# Patient Record
Sex: FEMALE | Race: WHITE | NOT HISPANIC OR LATINO | Employment: PART TIME | URBAN - METROPOLITAN AREA
[De-identification: names, ages, dates, MRNs, and addresses within clinical notes are randomized per-mention and may not be internally consistent; named-entity substitution may affect disease eponyms.]

---

## 2019-08-14 ENCOUNTER — TELEPHONE (OUTPATIENT)
Dept: SCHEDULING | Facility: IMAGING CENTER | Age: 39
End: 2019-08-14

## 2019-11-12 ENCOUNTER — OFFICE VISIT (OUTPATIENT)
Dept: MEDICAL GROUP | Facility: MEDICAL CENTER | Age: 39
End: 2019-11-12
Attending: FAMILY MEDICINE
Payer: MEDICAID

## 2019-11-12 VITALS
OXYGEN SATURATION: 96 % | WEIGHT: 190 LBS | HEART RATE: 100 BPM | TEMPERATURE: 98.7 F | SYSTOLIC BLOOD PRESSURE: 110 MMHG | HEIGHT: 63 IN | RESPIRATION RATE: 16 BRPM | BODY MASS INDEX: 33.66 KG/M2 | DIASTOLIC BLOOD PRESSURE: 64 MMHG

## 2019-11-12 DIAGNOSIS — K21.9 GASTROESOPHAGEAL REFLUX DISEASE WITHOUT ESOPHAGITIS: ICD-10-CM

## 2019-11-12 DIAGNOSIS — E66.9 OBESITY (BMI 30-39.9): ICD-10-CM

## 2019-11-12 DIAGNOSIS — Z23 NEEDS FLU SHOT: ICD-10-CM

## 2019-11-12 DIAGNOSIS — E73.9 LACTOSE INTOLERANCE: ICD-10-CM

## 2019-11-12 DIAGNOSIS — Z23 NEED FOR TDAP VACCINATION: ICD-10-CM

## 2019-11-12 DIAGNOSIS — Z00.00 HEALTHCARE MAINTENANCE: ICD-10-CM

## 2019-11-12 DIAGNOSIS — N28.9 KIDNEY DISEASE: ICD-10-CM

## 2019-11-12 DIAGNOSIS — Z12.39 SCREENING FOR MALIGNANT NEOPLASM OF BREAST: ICD-10-CM

## 2019-11-12 PROCEDURE — 90715 TDAP VACCINE 7 YRS/> IM: CPT | Performed by: FAMILY MEDICINE

## 2019-11-12 PROCEDURE — 99202 OFFICE O/P NEW SF 15 MIN: CPT | Performed by: FAMILY MEDICINE

## 2019-11-12 PROCEDURE — 99204 OFFICE O/P NEW MOD 45 MIN: CPT | Performed by: FAMILY MEDICINE

## 2019-11-12 PROCEDURE — 90686 IIV4 VACC NO PRSV 0.5 ML IM: CPT | Performed by: FAMILY MEDICINE

## 2019-11-12 RX ORDER — PANTOPRAZOLE SODIUM 40 MG/1
40 TABLET, DELAYED RELEASE ORAL
Refills: 0 | COMMUNITY
Start: 2019-09-10 | End: 2021-08-03

## 2019-11-12 RX ORDER — OMEPRAZOLE 20 MG/1
20 CAPSULE, DELAYED RELEASE ORAL DAILY
Qty: 30 CAP | Refills: 11 | Status: SHIPPED | OUTPATIENT
Start: 2019-11-12 | End: 2021-08-03

## 2019-11-12 SDOH — HEALTH STABILITY: MENTAL HEALTH: HOW OFTEN DO YOU HAVE A DRINK CONTAINING ALCOHOL?: MONTHLY OR LESS

## 2019-11-12 SDOH — HEALTH STABILITY: MENTAL HEALTH: HOW OFTEN DO YOU HAVE 6 OR MORE DRINKS ON ONE OCCASION?: LESS THAN MONTHLY

## 2019-11-12 NOTE — ASSESSMENT & PLAN NOTE
Patient is concerned about the increased rate of hair loss when questioned.  She also complains of feeling cold with some regularity.  She is not aware of any previous thyroid testing.  Through weight watchers she has lost 14 pounds in the past 9 months.  She is currently not attending that program.  Is anticipating pursuing further weight loss.

## 2019-11-12 NOTE — PROGRESS NOTES
Chief Complaint   Patient presents with   • Establish Care     possible restless leg syndrome   • Cough       HISTORY OF PRESENT ILLNESS: Patient is a 39 y.o. female established patient who presents today to establish care and discuss the following problems        Obesity (BMI 30-39.9)  Patient is concerned about the increased rate of hair loss when questioned.  She also complains of feeling cold with some regularity.  She is not aware of any previous thyroid testing.  Through weight watchers she has lost 14 pounds in the past 9 months.  She is currently not attending that program.  Is anticipating pursuing further weight loss.    Gastroesophageal reflux disease without esophagitis  Patient reports current substernal burning along with a recurrent cough may be associated with slight regurgitation.  She is not actually having formal emesis.  She reports that when she was taking omeprazole the cough symptom was not there.  She is been out of omeprazole for several months.  She denies black or bloody stools.    Social history-,   Patient Active Problem List    Diagnosis Date Noted   • Obesity (BMI 30-39.9) 11/12/2019   • Gastroesophageal reflux disease without esophagitis 11/12/2019   • Lactose intolerance 11/12/2019   • Kidney disease        Allergies:Patient has no known allergies.    Current Outpatient Medications   Medication Sig Dispense Refill   • omeprazole (PRILOSEC) 20 MG delayed-release capsule Take 1 Cap by mouth every day. 30 Cap 11     No current facility-administered medications for this visit.        Social History     Tobacco Use   • Smoking status: Never Smoker   • Smokeless tobacco: Never Used   Substance Use Topics   • Alcohol use: Yes     Frequency: Monthly or less     Binge frequency: Less than monthly   • Drug use: Never       Family History   Problem Relation Age of Onset   • Diabetes Mother    • Stroke Father    • Cancer Paternal Aunt         Breast   • Cancer Maternal  "Grandmother         Breast   • Heart Disease Neg Hx        ROS:  Review of Systems   Constitutional: Negative for fever, chills, weight loss and malaise/fatigue.   Eyes: Negative for blurred vision.   Respiratory: Negative for cough, sputum production, shortness of breath and wheezing.    Cardiovascular: Negative for chest pain, palpitations, orthopnea and leg swelling.   Gastrointestinal: Past for heartburn, nausea, without vomiting and abdominal pain.   Musculoskeletal: Negative for myalgias, back pain and joint pain.   Endo/Heme/Allergies: Does not bruise/bleed easily.               Exam:  /64 (BP Location: Left arm, Patient Position: Sitting, BP Cuff Size: Adult)   Pulse 100   Temp 37.1 °C (98.7 °F) (Temporal)   Resp 16   Ht 1.588 m (5' 2.5\")   Wt 86.2 kg (190 lb)   SpO2 96%   General:  Well nourished, well developed female in NAD  Head is grossly normal.  Neck: Supple without JVD or bruit. Thyroid is not enlarged. Trachea is midline.  Pulmonary: Clear to ausculation .  Normal effort. No rales, ronchi, or wheezing.  Cardiovascular: Regular rate and rhythm without murmur.  Abdomen-Abdomen is soft, normal bowel sounds, no masses, guarding, ororganomegaly, or tenderness.  Lower extremities- neg for pretibial edema, redness, tenderness.      Please note that this dictation was created using voice recognition software. I have made every reasonable attempt to correct obvious errors, but I expect that there are errors of grammar and possibly content that I did not discover before finalizing the note.    Assessment/Plan:  1. Kidney disease     2. Needs flu shot  Influenza Vaccine Quad Injection (PF)   3. Screening for malignant neoplasm of breast  MA-SCREENING MAMMO BILAT W/TOMOSYNTHESIS W/CAD   4. Obesity (BMI 30-39.9)  Patient identified as having weight management issue.  Appropriate orders and counseling given.    TSH   5. Gastroesophageal reflux disease without esophagitis  omeprazole (PRILOSEC) 20 MG " delayed-release capsule   6. Need for Tdap vaccination  Tdap =>8yo IM   7. Lactose intolerance     8. Healthcare maintenance  Comp Metabolic Panel    Lipid Profile     Plan: 1.  Collect fasting lipids, CMP, TSH  2.  Rx omeprazole capsules 20 mg  3.  Flu vaccine  4.  Patient is encouraged to actively limit calories to promote further weight loss  5.  Screening mammogram

## 2019-11-12 NOTE — ASSESSMENT & PLAN NOTE
Patient reports current substernal burning along with a recurrent cough may be associated with slight regurgitation.  She is not actually having formal emesis.  She reports that when she was taking omeprazole the cough symptom was not there.  She is been out of omeprazole for several months.  She denies black or bloody stools.

## 2019-12-07 ENCOUNTER — HOSPITAL ENCOUNTER (OUTPATIENT)
Dept: RADIOLOGY | Facility: MEDICAL CENTER | Age: 39
End: 2019-12-07
Attending: FAMILY MEDICINE
Payer: MEDICAID

## 2019-12-07 DIAGNOSIS — Z12.39 SCREENING FOR MALIGNANT NEOPLASM OF BREAST: ICD-10-CM

## 2019-12-07 PROCEDURE — 77063 BREAST TOMOSYNTHESIS BI: CPT

## 2019-12-16 ENCOUNTER — TELEPHONE (OUTPATIENT)
Dept: MEDICAL GROUP | Facility: MEDICAL CENTER | Age: 39
End: 2019-12-16

## 2019-12-17 NOTE — TELEPHONE ENCOUNTER
Phone Number Called: [phone] (home)       Call outcome: left message for patient to call back regarding message below    Message: Mammogram results are normal. Please repeat exam in 1 year.  Dr. Bright

## 2019-12-17 NOTE — TELEPHONE ENCOUNTER
----- Message from Shane Tyler M.D. sent at 12/12/2019  8:21 AM PST -----  Mammogram results are normal. Please repeat exam in 1 year.  Dr. Bright

## 2019-12-19 RX ORDER — OMEPRAZOLE 20 MG/1
20 CAPSULE, DELAYED RELEASE ORAL 2 TIMES DAILY
Qty: 60 CAP | Refills: 4 | Status: SHIPPED
Start: 2019-12-19 | End: 2021-08-03

## 2019-12-19 NOTE — TELEPHONE ENCOUNTER
Patient reports that recent restart of omeprazole 20 mg daily is not controlling her substernal burning/heartburn.  In the past she reports that she actually had to take this medicine routinely twice daily.  New Rx for twice daily dosage written.  Dr. Bright

## 2020-05-18 ENCOUNTER — OFFICE VISIT (OUTPATIENT)
Dept: MEDICAL GROUP | Facility: MEDICAL CENTER | Age: 40
End: 2020-05-18
Attending: FAMILY MEDICINE
Payer: MEDICAID

## 2020-05-18 VITALS
RESPIRATION RATE: 20 BRPM | WEIGHT: 193 LBS | SYSTOLIC BLOOD PRESSURE: 128 MMHG | HEIGHT: 63 IN | BODY MASS INDEX: 34.2 KG/M2 | TEMPERATURE: 97.6 F | OXYGEN SATURATION: 94 % | HEART RATE: 103 BPM | DIASTOLIC BLOOD PRESSURE: 86 MMHG

## 2020-05-18 DIAGNOSIS — R05.8 ALLERGIC COUGH: ICD-10-CM

## 2020-05-18 DIAGNOSIS — L23.9 ALLERGIC DERMATITIS: ICD-10-CM

## 2020-05-18 PROCEDURE — 99213 OFFICE O/P EST LOW 20 MIN: CPT | Performed by: FAMILY MEDICINE

## 2020-05-18 RX ORDER — CIMETIDINE 400 MG/1
400 TABLET, FILM COATED ORAL 2 TIMES DAILY
Qty: 60 TAB | Refills: 6 | Status: SHIPPED | OUTPATIENT
Start: 2020-05-18 | End: 2021-08-03

## 2020-05-18 RX ORDER — FLUTICASONE FUROATE 50 UG/1
1 POWDER RESPIRATORY (INHALATION) DAILY
Qty: 1 EACH | Refills: 11 | Status: SHIPPED | OUTPATIENT
Start: 2020-05-18 | End: 2021-08-03

## 2020-05-19 NOTE — PROGRESS NOTES
"Subjective:      Abelardo Crowell is a 39 y.o. female who presents with Rash            HPI 1.  Allergic dermatitis-patient reports a many year history of episodic itching sometimes with pinkish discoloration occasionally with small hives/papules that will erupt over her neck, upper extremities and just recently 4 days ago an episode at her posterior neck hairline bilaterally.  They are intensely pruritic cause her to scratch but typically resolve over 2 to 3 days.  There are no associated vesicles or fever.  She cannot find any obvious trigger.  Medications have not recently changed (long-term Rx of omeprazole x5 years).  2.  Allergic cough-patient reports that if she becomes very active physically she will start to develop a cough and rarely wheeze.  She denies current routine cough when not exerting herself, no recent fever, cough is nonproductive    ROS negative for chest pain, chest pressure, palpitations       Objective:     /86 (BP Location: Left arm, Patient Position: Sitting, BP Cuff Size: Adult)   Pulse (!) 103   Temp 36.4 °C (97.6 °F) (Temporal)   Resp 20   Ht 1.588 m (5' 2.5\")   Wt 87.5 kg (193 lb)   SpO2 94%   BMI 34.74 kg/m²      Physical Exam  Gen.- alert, cooperative, in no acute distress  Neck- midline trachea, thyroid not enlarged or tender,supple, no cervical adenopathy  Chest-clear to auscultation and percussion with normal breath sounds. No retractions. Chest wall nontender  Cardiac- regular rhythm and rate. No murmur, thrill, or heave  Skin-clear at this time other than 3 faint pink papules 3 to 4 mm apiece at her posterior neck hairline          Assessment/Plan:       1. Allergic dermatitis    - cimetidine (TAGAMET) 400 MG Tab; Take 1 Tab by mouth 2 times a day.  Dispense: 60 Tab; Refill: 6    2. Allergic cough    - Fluticasone Furoate (ARNUITY ELLIPTA) 50 MCG/ACT AEROSOL POWDER, BREATH ACTIVATED; Inhale 1 Puff by mouth every day.  Dispense: 1 Each; Refill: 11  Plan: 1.  Trial of " Arnuity 50 mcg 1 inhalation daily  2.  Trial of Tagamet 400 to 800 mg nightly  3.  Revisit in 1 month-consider Kenalog injection

## 2020-06-18 ENCOUNTER — OFFICE VISIT (OUTPATIENT)
Dept: MEDICAL GROUP | Facility: MEDICAL CENTER | Age: 40
End: 2020-06-18
Attending: FAMILY MEDICINE
Payer: MEDICAID

## 2020-06-18 VITALS
BODY MASS INDEX: 34.2 KG/M2 | DIASTOLIC BLOOD PRESSURE: 78 MMHG | SYSTOLIC BLOOD PRESSURE: 122 MMHG | HEIGHT: 63 IN | RESPIRATION RATE: 16 BRPM | TEMPERATURE: 97.9 F | OXYGEN SATURATION: 95 % | WEIGHT: 193 LBS | HEART RATE: 80 BPM

## 2020-06-18 DIAGNOSIS — R05.8 ALLERGIC COUGH: ICD-10-CM

## 2020-06-18 PROCEDURE — 99213 OFFICE O/P EST LOW 20 MIN: CPT | Performed by: FAMILY MEDICINE

## 2020-06-18 RX ORDER — ALBUTEROL SULFATE 90 UG/1
2 AEROSOL, METERED RESPIRATORY (INHALATION) EVERY 6 HOURS PRN
Qty: 8.5 G | Refills: 6 | Status: SHIPPED | OUTPATIENT
Start: 2020-06-18 | End: 2021-08-03 | Stop reason: SDUPTHER

## 2020-06-18 NOTE — PROGRESS NOTES
"Subjective:      Abelardo Crowell is a 39 y.o. female who presents with No chief complaint on file.            HPI 1.  Allergic cough-patient reports she noticed that most of mild improvement with starting the 50 mcg strength of Arnuity 1 month ago.  However she ran out of her Arnuity inhaler 2 days ago and noticed marked increase in the intensity and frequency of cough.  Patient reports that she will very rarely bring up some clear phlegm which does seem to help at that moment.  ROS positive for occasional wheezing.  No current fever or sore throat.       Objective:     /78 (BP Location: Left arm, Patient Position: Sitting, BP Cuff Size: Large adult)   Pulse 80   Temp 36.6 °C (97.9 °F) (Temporal)   Resp 16   Ht 1.588 m (5' 2.52\")   Wt 87.5 kg (193 lb)   SpO2 95%   BMI 34.72 kg/m²      Physical Exam  Gen.- alert, cooperative, in no acute distress  Neck- midline trachea, thyroid not enlarged or tender,supple, no cervical adenopathy  Chest-clear to auscultation and percussion with normal breath sounds. No retractions. Chest wall nontender  Cardiac- regular rhythm and rate. No murmur, thrill, or heave  Oropharynx-clear pink moist mucosa without swelling redness or exudate.  Tongue is midline          Assessment/Plan:       1. Allergic cough    Plan: 1.  Trial increasing Arnuity up to 200 mcg strength 1 puff daily  2.  Revisit with me in 1 month  3.  Patient is reminded to use albuterol rescue inhaler as needed  "

## 2020-07-16 ENCOUNTER — OFFICE VISIT (OUTPATIENT)
Dept: MEDICAL GROUP | Facility: MEDICAL CENTER | Age: 40
End: 2020-07-16
Attending: FAMILY MEDICINE
Payer: MEDICAID

## 2020-07-16 VITALS
OXYGEN SATURATION: 98 % | WEIGHT: 193 LBS | TEMPERATURE: 97.4 F | RESPIRATION RATE: 16 BRPM | SYSTOLIC BLOOD PRESSURE: 128 MMHG | HEART RATE: 88 BPM | DIASTOLIC BLOOD PRESSURE: 88 MMHG | BODY MASS INDEX: 34.2 KG/M2 | HEIGHT: 63 IN

## 2020-07-16 DIAGNOSIS — J30.1 SEASONAL ALLERGIC RHINITIS DUE TO POLLEN: ICD-10-CM

## 2020-07-16 DIAGNOSIS — R05.8 ALLERGIC COUGH: ICD-10-CM

## 2020-07-16 PROCEDURE — 99213 OFFICE O/P EST LOW 20 MIN: CPT | Performed by: FAMILY MEDICINE

## 2020-07-16 RX ORDER — LORATADINE 10 MG/1
10 TABLET ORAL DAILY
Qty: 30 TAB | Refills: 11 | Status: SHIPPED | OUTPATIENT
Start: 2020-07-16 | End: 2021-07-07

## 2020-07-16 NOTE — PROGRESS NOTES
"Subjective:      Abelardo Crowell is a 39 y.o. female who presents with No chief complaint on file.            HPI 1.  Allergic cough-patient reports cough is about 95% improved since adding the Arnuity 200 mcg 1 puff daily.  She does however use her rescue albuterol inhaler once or twice daily with good effect as well.  Not reporting any significant dyspnea or wheeze.  2.  Allergic rhinitis-patient notes persistent stuffiness both nostrils.  She notes occasional watery eyes and some increase in sneezing.    ROS negative for fever, chest pain, palpitations       Objective:     /88 (BP Location: Left arm, Patient Position: Sitting, BP Cuff Size: Adult)   Pulse 88   Temp 36.3 °C (97.4 °F) (Temporal)   Resp 16   Ht 1.588 m (5' 2.52\")   Wt 87.5 kg (193 lb)   SpO2 98%   BMI 34.72 kg/m²      Physical Exam  Gen.- alert, cooperative, in no acute distress  Neck- midline trachea, thyroid not enlarged or tender,supple, no cervical adenopathy  Chest-clear to auscultation and percussion with normal breath sounds. No retractions. Chest wall nontender  Cardiac- regular rhythm and rate. No murmur, thrill, or heave  .  Oropharynx-clear pink moist mucosa without swelling redness or exudate  Nares-mildly reddened and mildly thickened bilaterally with no purulent or bloody discharge          Assessment/Plan:       1. Allergic cough      2. Seasonal allergic rhinitis due to pollen    Plan: 1.  Make go up to 2 puffs twice daily on the Arnuity 200 mcg  2.  Claritin 10 mg 1 p.o. daily  3.  Revisit in 1 month  "

## 2020-07-27 ENCOUNTER — TELEPHONE (OUTPATIENT)
Dept: MEDICAL GROUP | Facility: MEDICAL CENTER | Age: 40
End: 2020-07-27

## 2020-07-28 DIAGNOSIS — R05.8 ALLERGIC COUGH: ICD-10-CM

## 2020-07-28 NOTE — TELEPHONE ENCOUNTER
Please let patient know that the cimetidine is not available.  She should be taking her omeprazole prescription daily and we will need to rely on that along with minimizing tomato-containing diet intake, avoiding overeating or lying down within 2 hours of any meal.

## 2020-07-28 NOTE — TELEPHONE ENCOUNTER
Received request via: Patient, please update directions on the arnuity to 2 puffs daily    Was the patient seen in the last year in this department? Yes    Does the patient have an active prescription (recently filled or refills available) for medication(s) requested? No

## 2020-08-11 ENCOUNTER — OFFICE VISIT (OUTPATIENT)
Dept: MEDICAL GROUP | Facility: MEDICAL CENTER | Age: 40
End: 2020-08-11
Attending: FAMILY MEDICINE
Payer: MEDICAID

## 2020-08-11 VITALS
WEIGHT: 190.2 LBS | SYSTOLIC BLOOD PRESSURE: 118 MMHG | RESPIRATION RATE: 16 BRPM | OXYGEN SATURATION: 98 % | HEART RATE: 92 BPM | HEIGHT: 63 IN | DIASTOLIC BLOOD PRESSURE: 68 MMHG | BODY MASS INDEX: 33.7 KG/M2 | TEMPERATURE: 98.9 F

## 2020-08-11 DIAGNOSIS — R05.8 ALLERGIC COUGH: ICD-10-CM

## 2020-08-11 PROCEDURE — 99213 OFFICE O/P EST LOW 20 MIN: CPT | Performed by: FAMILY MEDICINE

## 2020-08-11 RX ORDER — METHYLPREDNISOLONE 4 MG/1
TABLET ORAL
Qty: 21 TAB | Refills: 0 | Status: SHIPPED | OUTPATIENT
Start: 2020-08-11 | End: 2021-08-03

## 2020-08-12 NOTE — PROGRESS NOTES
"Subjective:      Abelardo Crowell is a 40 y.o. female who presents with No chief complaint on file.            HPI 1.  Allergic cough-patient with a history of current severe allergic cough, history of previous exercise-induced bronchospasm, non-smoker, found that following her directions to use 2 puffs of Arnuity per day she finally achieved excellent control of her previous frequent deep and loose cough.  Unfortunately since she can only receive a 30 puff inhaler she then ran out of medication altogether for about 13 days.  The frequent deep cough returned which is disruptive both to her sleep and to her work as a .  Cough is rarely productive of some clear sputum.    ROS negative for nausea, heartburn, nasal congestion       Objective:     /68 (BP Location: Left arm, Patient Position: Sitting, BP Cuff Size: Large adult)   Pulse 92   Temp 37.2 °C (98.9 °F) (Temporal)   Resp 16   Ht 1.588 m (5' 2.52\")   Wt 86.3 kg (190 lb 3.2 oz)   SpO2 98%   BMI 34.21 kg/m²      Physical Exam  Gen.- alert, cooperative, in no acute distress  Neck- midline trachea, thyroid not enlarged or tender,supple, no cervical adenopathy  Chest-clear to auscultation and percussion with normal breath sounds. No retractions. Chest wall nontender  Cardiac- regular rhythm and rate. No murmur, thrill, or heave  Lower extremities-negative for ankle edema, redness, tenderness   Oropharynx-clear pink moist mucosa without swelling redness or exudate.  Tongue is midline       Assessment/Plan:        1. Allergic cough    - methylPREDNISolone (MEDROL DOSEPAK) 4 MG Tablet Therapy Pack; 6 by mouth daily 1, 5 by mouth day 2, 4 by mouth day 3, 3 by mouth daily 4, 2 by mouth day 5, 1 by mouth day 6  Dispense: 21 Tab; Refill: 0  - Fluticasone Furoate 200 MCG/ACT AEROSOL POWDER, BREATH ACTIVATED; Inhale 1 Puff by mouth 2 Times a Day.  Dispense: 2 Each; Refill: 11  Plan: 1.  Immediate use of Medrol Dosepak  2.  New Rx for Arnuity 200 mcg, 2 " units, 2 puffs daily  3.  Revisit with me in 1 month

## 2020-09-01 ENCOUNTER — TELEPHONE (OUTPATIENT)
Dept: MEDICAL GROUP | Facility: MEDICAL CENTER | Age: 40
End: 2020-09-01

## 2020-09-01 DIAGNOSIS — R05.8 ALLERGIC COUGH: ICD-10-CM

## 2020-09-03 ENCOUNTER — TELEPHONE (OUTPATIENT)
Dept: MEDICAL GROUP | Facility: MEDICAL CENTER | Age: 40
End: 2020-09-03

## 2020-09-03 DIAGNOSIS — R05.8 ALLERGIC COUGH: ICD-10-CM

## 2020-09-03 RX ORDER — FLUTICASONE FUROATE 200 UG/1
1 POWDER RESPIRATORY (INHALATION) DAILY
Qty: 1 EACH | Refills: 11 | Status: SHIPPED | OUTPATIENT
Start: 2020-09-03 | End: 2021-08-03

## 2020-09-03 NOTE — TELEPHONE ENCOUNTER
Dr. Tyler,     Fluticasone Furoate 200 mcg/act inhalation was sent 09/01/2020  Directions have both 2 puffs daily and 1 puff daily.     Can you please resend script with clarification on sig direction.     Please advise.     Thank you,     Suze Newby  Medical Assistant

## 2020-09-04 ENCOUNTER — TELEPHONE (OUTPATIENT)
Dept: MEDICAL GROUP | Facility: MEDICAL CENTER | Age: 40
End: 2020-09-04

## 2020-09-10 ENCOUNTER — OFFICE VISIT (OUTPATIENT)
Dept: MEDICAL GROUP | Facility: MEDICAL CENTER | Age: 40
End: 2020-09-10
Attending: FAMILY MEDICINE
Payer: MEDICAID

## 2020-09-10 VITALS
TEMPERATURE: 98.2 F | HEIGHT: 63 IN | HEART RATE: 92 BPM | SYSTOLIC BLOOD PRESSURE: 118 MMHG | OXYGEN SATURATION: 100 % | WEIGHT: 189 LBS | BODY MASS INDEX: 33.49 KG/M2 | DIASTOLIC BLOOD PRESSURE: 86 MMHG | RESPIRATION RATE: 16 BRPM

## 2020-09-10 DIAGNOSIS — R05.8 ALLERGIC COUGH: ICD-10-CM

## 2020-09-10 PROCEDURE — 99213 OFFICE O/P EST LOW 20 MIN: CPT | Performed by: FAMILY MEDICINE

## 2020-09-10 NOTE — PROGRESS NOTES
"Subjective:      Abelardo Crowell is a 40 y.o. female who presents with Cough (due to allergies, allergic cough)            HPI 1.  Allergic cough-patient reports that she has had persistent symptoms of allergic nonproductive cough dating back about 7 years.  She did get good control using 200 mcg strength Arnuity inhaler twice daily however her health plan will not approve that level of use.  Taking 1 puff/day has given her only minimal reduction in the frequency and intensity of the cough.    ROS negative for hemoptysis, fever, back pain       Objective:     /86 (BP Location: Left arm, Patient Position: Sitting, BP Cuff Size: Large adult)   Pulse 92   Temp 36.8 °C (98.2 °F) (Temporal)   Resp 16   Ht 1.588 m (5' 2.52\")   Wt 85.7 kg (189 lb)   SpO2 100%   BMI 34.00 kg/m²      Physical Exam  Gen.- alert, cooperative, in no acute distress  Neck- midline trachea, thyroid not enlarged or tender,supple, no cervical adenopathy  Chest-clear to auscultation and percussion with normal breath sounds. No retractions. Chest wall nontender  Cardiac- regular rhythm and rate. No murmur, thrill, or heave            Assessment/Plan:        1. Allergic cough    - beclomethasone HFA (QVAR REDIHALER) 80 MCG/ACT inhaler; Inhale 2 Puffs by mouth 2 times a day.  Dispense: 1 Each; Refill: 6  Plan: 1.  In an attempt to allow her twice a day access we will switch her to Qvar 80 mcg/puff (120puffs/unit) with directions of 2 puffs twice daily this could be increased later if needed to 4 puffs twice daily (maximum)  "

## 2020-10-20 ENCOUNTER — OFFICE VISIT (OUTPATIENT)
Dept: MEDICAL GROUP | Facility: MEDICAL CENTER | Age: 40
End: 2020-10-20
Attending: FAMILY MEDICINE
Payer: MEDICAID

## 2020-10-20 VITALS
HEART RATE: 96 BPM | BODY MASS INDEX: 34.55 KG/M2 | RESPIRATION RATE: 16 BRPM | WEIGHT: 195 LBS | OXYGEN SATURATION: 98 % | TEMPERATURE: 98.3 F | HEIGHT: 63 IN | SYSTOLIC BLOOD PRESSURE: 132 MMHG | DIASTOLIC BLOOD PRESSURE: 80 MMHG

## 2020-10-20 DIAGNOSIS — Z23 NEEDS FLU SHOT: ICD-10-CM

## 2020-10-20 DIAGNOSIS — J45.30 MILD PERSISTENT ASTHMA WITHOUT COMPLICATION: ICD-10-CM

## 2020-10-20 DIAGNOSIS — J30.1 SEASONAL ALLERGIC RHINITIS DUE TO POLLEN: ICD-10-CM

## 2020-10-20 PROCEDURE — 99213 OFFICE O/P EST LOW 20 MIN: CPT | Performed by: FAMILY MEDICINE

## 2020-10-20 PROCEDURE — 99213 OFFICE O/P EST LOW 20 MIN: CPT | Mod: 25 | Performed by: FAMILY MEDICINE

## 2020-10-20 PROCEDURE — 99212 OFFICE O/P EST SF 10 MIN: CPT | Performed by: FAMILY MEDICINE

## 2020-10-20 PROCEDURE — 90686 IIV4 VACC NO PRSV 0.5 ML IM: CPT | Performed by: FAMILY MEDICINE

## 2020-10-20 RX ORDER — FLUTICASONE PROPIONATE 50 MCG
1 SPRAY, SUSPENSION (ML) NASAL DAILY
Qty: 16 G | Refills: 11 | Status: SHIPPED | OUTPATIENT
Start: 2020-10-20 | End: 2021-08-03

## 2020-10-20 NOTE — PROGRESS NOTES
"Subjective:      Abelardo Crowell is a 40 y.o. female who presents with Cough            HPI 1.  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  They currently do not have a face shield option.  So she is being placed on FMLA for the next 3 months pending the duration of the current mask wearing mandate.  She reports current breathing is just mildly tight several times per day since she is not wearing the mask.  The atmospheric drip smoke has also cleared which has greatly improved her breathing capacity.    ROS negative for fever, current chest pain, palpitations       Objective:     /80 (BP Location: Left arm, Patient Position: Sitting, BP Cuff Size: Adult)   Pulse 96   Temp 36.8 °C (98.3 °F) (Temporal)   Resp 16   Ht 1.588 m (5' 2.52\")   Wt 88.5 kg (195 lb)   SpO2 98%   BMI 35.08 kg/m²      Physical Exam  Gen.- alert, cooperative, in no acute distress  Neck- midline trachea, thyroid not enlarged or tender,supple, no cervical adenopathy  Chest-clear to auscultation and percussion with normal breath sounds. No retractions. Chest wall nontender  Cardiac- regular rhythm and rate. No murmur, thrill, or heave  Nares-slight swelling and erythema of the right nasal passage with no overt occlusion          Assessment/Plan:        1. Seasonal allergic rhinitis due to pollen    - fluticasone (FLONASE) 50 MCG/ACT nasal spray; Spray 1 Spray in nose every day.  Dispense: 16 g; Refill: 11    2. Needs flu shot      3. Mild persistent asthma without complication    Plan: 1.  FMLA paperwork completed  2.  Rx fluticasone nasal spray daily  3.  Flu vaccine today  4.  Revisit in 3 months as needed  "

## 2021-07-02 DIAGNOSIS — J30.1 SEASONAL ALLERGIC RHINITIS DUE TO POLLEN: ICD-10-CM

## 2021-07-07 RX ORDER — LORATADINE 10 MG/1
TABLET ORAL
Qty: 30 TABLET | Refills: 11 | Status: ON HOLD | OUTPATIENT
Start: 2021-07-07 | End: 2021-10-04

## 2021-08-03 ENCOUNTER — OFFICE VISIT (OUTPATIENT)
Dept: MEDICAL GROUP | Facility: MEDICAL CENTER | Age: 41
End: 2021-08-03
Attending: FAMILY MEDICINE
Payer: MEDICAID

## 2021-08-03 VITALS
TEMPERATURE: 97.9 F | SYSTOLIC BLOOD PRESSURE: 128 MMHG | DIASTOLIC BLOOD PRESSURE: 88 MMHG | HEART RATE: 88 BPM | WEIGHT: 182 LBS | OXYGEN SATURATION: 96 % | HEIGHT: 63 IN | BODY MASS INDEX: 32.25 KG/M2 | RESPIRATION RATE: 16 BRPM

## 2021-08-03 DIAGNOSIS — F41.9 ANXIETY: ICD-10-CM

## 2021-08-03 DIAGNOSIS — K21.9 GASTROESOPHAGEAL REFLUX DISEASE WITHOUT ESOPHAGITIS: ICD-10-CM

## 2021-08-03 DIAGNOSIS — R05.8 ALLERGIC COUGH: ICD-10-CM

## 2021-08-03 DIAGNOSIS — G47.30 SLEEP APNEA IN ADULT: ICD-10-CM

## 2021-08-03 PROCEDURE — 99212 OFFICE O/P EST SF 10 MIN: CPT | Performed by: FAMILY MEDICINE

## 2021-08-03 PROCEDURE — 99214 OFFICE O/P EST MOD 30 MIN: CPT | Performed by: FAMILY MEDICINE

## 2021-08-03 RX ORDER — ALPRAZOLAM 0.5 MG/1
TABLET ORAL
Qty: 10 TABLET | Refills: 0 | Status: SHIPPED | OUTPATIENT
Start: 2021-08-03 | End: 2021-09-03

## 2021-08-03 RX ORDER — OMEPRAZOLE 40 MG/1
40 CAPSULE, DELAYED RELEASE ORAL
Qty: 30 CAPSULE | Refills: 11 | Status: SHIPPED | OUTPATIENT
Start: 2021-08-03

## 2021-08-03 RX ORDER — ALBUTEROL SULFATE 90 UG/1
2 AEROSOL, METERED RESPIRATORY (INHALATION) EVERY 6 HOURS PRN
Qty: 8.5 G | Refills: 6 | Status: SHIPPED | OUTPATIENT
Start: 2021-08-03

## 2021-08-03 ASSESSMENT — PATIENT HEALTH QUESTIONNAIRE - PHQ9: CLINICAL INTERPRETATION OF PHQ2 SCORE: 0

## 2021-08-03 NOTE — PROGRESS NOTES
"Subjective:      Abelardo Crowell is a 41 y.o. female who presents with Asthma (follow up,pt states laying in R side\"having trouble breathing\") and Fatigue (\"tired\")            HPI 1.  GERD-patient reports symptoms are well controlled as long as she takes her 40 mg of omeprazole daily.  Within 2 days of medication, she will become significantly symptomatic with reflux and increasing nocturnal cough.  She reports she has discontinued use of the Arnuity steroid inhaler due to lack of effect.  She does still have access to her albuterol rescue inhaler as needed.  2.  Fatigue-patient reports the past about 11 AM she is extremely fatigued for the rest of the day.  She does not feel rested in the morning when she gets up.  There is not much observation of her sleep habits with regard to possible apneic spells.  Patient has lost 13 pounds since last fall through intentional restriction of portions.  3.  Anxiety regarding flying-patient needs to fly to [place] and has a very strong irrational fear of flying.  She requests some assistance to help control her anxiety.  She does not normally experience anxiety to any significant level in any other setting.  ROS positive for intermittent pressure feeling in her right upper quadrant that clears immediately with direct pressure.  This will typically occur intermittently after a meal.  There is no persistent nausea, no current active heartburn (on omeprazole).  Bowel habit is unchanged.  Social history-, working as a   Current medication-  Prior to Admission medications    Medication Sig Start Date End Date Taking? Authorizing Provider   omeprazole (PRILOSEC) 40 MG delayed-release capsule Take 1 capsule by mouth every day. 8/3/21  Yes Shane Tyler M.D.   albuterol 108 (90 Base) MCG/ACT Aero Soln inhalation aerosol Inhale 2 Puffs every 6 hours as needed for Shortness of Breath. 8/3/21  Yes Shane Tyler M.D.   ALPRAZolam (XANAX) 0.5 MG Tab Please take 1 to 2 " "tablets every 4-6 hours for anxiety associated with flying 8/3/21 9/3/21 Yes Shane Tyler M.D.   loratadine (CLARITIN) 10 MG Tab TAKE 1 TABLET BY MOUTH EVERY DAY 7/7/21  Yes Shane Tyler M.D.   omeprazole (PRILOSEC) 20 MG delayed-release capsule Take 1 Cap by mouth 2 times a day.  Patient not taking: Reported on 8/3/2021 12/19/19   Shane Tyler M.D.   pantoprazole (PROTONIX) 40 MG Tablet Delayed Response Take 40 mg by mouth every day.  Patient not taking: Reported on 8/3/2021 9/10/19   Physician Outpatient          Objective:     /88 (BP Location: Left arm, Patient Position: Sitting)   Pulse 88   Temp 36.6 °C (97.9 °F) (Temporal)   Resp 16   Ht 1.588 m (5' 2.5\")   Wt 82.6 kg (182 lb)   SpO2 96%   BMI 32.76 kg/m²      Physical Exam  Gen.- alert, cooperative, in no acute distress  Neck- midline trachea, thyroid not enlarged or tender,supple, no cervical adenopathy  Chest-clear to auscultation and percussion with normal breath sounds. No retractions. Chest wall nontender  Cardiac- regular rhythm and rate. No murmur, thrill, or heave  Oropharynx-somewhat crowded posterior pharynx.  No obvious swelling.  Tongue is midline  Nares-normal lining without exudate drainage or bleeding.    Psych-normal affect with good eye contact. Normal grooming. Oriented x4.  Abdomen- normal bowel sounds, soft without guarding. Liver and spleen not enlarged, no palpable masses or tenderness.              Assessment/Plan:        1. Gastroesophageal reflux disease without esophagitis      2. Allergic cough      3. Sleep apnea in adult    Plan: 1.  Renew omeprazole  2.  Referral for sleep study to evaluate possible obstructive sleep apnea  3.  Rx alprazolam 0.5 mg patient may take 1 to 2 tablets every 4-6 hours as needed before flying  "

## 2021-08-24 ENCOUNTER — OFFICE VISIT (OUTPATIENT)
Dept: MEDICAL GROUP | Facility: MEDICAL CENTER | Age: 41
End: 2021-08-24
Attending: FAMILY MEDICINE
Payer: MEDICAID

## 2021-08-24 VITALS
SYSTOLIC BLOOD PRESSURE: 116 MMHG | HEIGHT: 63 IN | RESPIRATION RATE: 16 BRPM | HEART RATE: 84 BPM | DIASTOLIC BLOOD PRESSURE: 78 MMHG | BODY MASS INDEX: 32.43 KG/M2 | WEIGHT: 183 LBS | OXYGEN SATURATION: 99 % | TEMPERATURE: 97.7 F

## 2021-08-24 DIAGNOSIS — R10.11 RIGHT UPPER QUADRANT PAIN: ICD-10-CM

## 2021-08-24 PROCEDURE — 99213 OFFICE O/P EST LOW 20 MIN: CPT | Performed by: FAMILY MEDICINE

## 2021-08-24 PROCEDURE — 99212 OFFICE O/P EST SF 10 MIN: CPT | Performed by: FAMILY MEDICINE

## 2021-08-24 NOTE — PROGRESS NOTES
"Subjective     Abelardo Crowell is a 41 y.o. female who presents with Abdominal Pain            1.  Right upper quadrant pain    Patient reports onset about 2 months ago of repeated episodes of predominantly bloating along with some fullness and mild pain that she will experience below the ribs in the right upper quadrant.  She reports this will happen about a half an hour after most meals.  She reports that if there is a high fat content the symptoms are more pronounced.  ROS negative for actual vomiting, dysuria, change in bowel habits.           Objective     /78   Pulse 84   Temp 36.5 °C (97.7 °F) (Temporal)   Resp 16   Ht 1.588 m (5' 2.52\")   Wt 83 kg (183 lb)   SpO2 99%   BMI 32.92 kg/m²      Physical Exam  Gen.- alert, cooperative, in no acute distress  Neck- midline trachea, thyroid not enlarged or tender,supple, no cervical adenopathy  Chest-clear to auscultation and percussion with normal breath sounds. No retractions. Chest wall nontender  Cardiac- regular rhythm and rate. No murmur, thrill, or heave  Abdomen- normal bowel sounds, soft without guarding. Liver and spleen not enlarged, no palpable masses or tenderness.                      Assessment & Plan        1. Right upper quadrant pain-clinically highly suspicious for acute cholecystitis    - US-ABDOMEN COMPLETE SURVEY; Future  Plan: 1.  Abdominal ultrasound, consideration of general surgery consultation.              "

## 2021-08-26 ENCOUNTER — HOSPITAL ENCOUNTER (OUTPATIENT)
Dept: RADIOLOGY | Facility: MEDICAL CENTER | Age: 41
End: 2021-08-26
Attending: FAMILY MEDICINE
Payer: MEDICAID

## 2021-08-26 ENCOUNTER — TELEPHONE (OUTPATIENT)
Dept: MEDICAL GROUP | Facility: MEDICAL CENTER | Age: 41
End: 2021-08-26

## 2021-08-26 DIAGNOSIS — K80.18 CALCULUS OF GALLBLADDER WITH CHOLECYSTITIS OF OTHER ACUITY WITHOUT OBSTRUCTION: ICD-10-CM

## 2021-08-26 DIAGNOSIS — R10.11 RIGHT UPPER QUADRANT PAIN: ICD-10-CM

## 2021-08-26 PROCEDURE — 76700 US EXAM ABDOM COMPLETE: CPT

## 2021-09-17 ENCOUNTER — PRE-ADMISSION TESTING (OUTPATIENT)
Dept: ADMISSIONS | Facility: MEDICAL CENTER | Age: 41
End: 2021-09-17
Attending: SURGERY
Payer: MEDICAID

## 2021-09-17 DIAGNOSIS — Z01.812 PRE-OPERATIVE LABORATORY EXAMINATION: ICD-10-CM

## 2021-09-17 LAB
ANION GAP SERPL CALC-SCNC: 10 MMOL/L (ref 7–16)
BUN SERPL-MCNC: 9 MG/DL (ref 8–22)
CALCIUM SERPL-MCNC: 8.9 MG/DL (ref 8.5–10.5)
CHLORIDE SERPL-SCNC: 103 MMOL/L (ref 96–112)
CO2 SERPL-SCNC: 24 MMOL/L (ref 20–33)
CREAT SERPL-MCNC: 0.94 MG/DL (ref 0.5–1.4)
ERYTHROCYTE [DISTWIDTH] IN BLOOD BY AUTOMATED COUNT: 43.3 FL (ref 35.9–50)
GLUCOSE SERPL-MCNC: 96 MG/DL (ref 65–99)
HCT VFR BLD AUTO: 35.9 % (ref 37–47)
HGB BLD-MCNC: 10.2 G/DL (ref 12–16)
MCH RBC QN AUTO: 19.8 PG (ref 27–33)
MCHC RBC AUTO-ENTMCNC: 28.4 G/DL (ref 33.6–35)
MCV RBC AUTO: 69.7 FL (ref 81.4–97.8)
PLATELET # BLD AUTO: 500 K/UL (ref 164–446)
PMV BLD AUTO: 8.9 FL (ref 9–12.9)
POTASSIUM SERPL-SCNC: 3.6 MMOL/L (ref 3.6–5.5)
RBC # BLD AUTO: 5.15 M/UL (ref 4.2–5.4)
SODIUM SERPL-SCNC: 137 MMOL/L (ref 135–145)
WBC # BLD AUTO: 5 K/UL (ref 4.8–10.8)

## 2021-09-17 PROCEDURE — 36415 COLL VENOUS BLD VENIPUNCTURE: CPT

## 2021-09-17 PROCEDURE — 80048 BASIC METABOLIC PNL TOTAL CA: CPT

## 2021-09-17 PROCEDURE — 85027 COMPLETE CBC AUTOMATED: CPT

## 2021-09-17 NOTE — OR NURSING
Abnormal lab results from today 9- sent to Dr. Dawson @ [id number]. Dr. Dawson's MA Yelitza was verbally made aware of the above @3859.

## 2021-09-21 ENCOUNTER — TELEPHONE (OUTPATIENT)
Dept: MEDICAL GROUP | Facility: MEDICAL CENTER | Age: 41
End: 2021-09-21

## 2021-09-21 DIAGNOSIS — K04.7 DENTAL INFECTION: ICD-10-CM

## 2021-09-21 RX ORDER — FLUCONAZOLE 150 MG/1
150 TABLET ORAL DAILY
Qty: 2 TABLET | Refills: 0 | Status: SHIPPED | OUTPATIENT
Start: 2021-09-21 | End: 2021-11-17

## 2021-09-21 RX ORDER — AMOXICILLIN 500 MG/1
500 CAPSULE ORAL 3 TIMES DAILY
Qty: 30 CAPSULE | Refills: 0 | Status: SHIPPED | OUTPATIENT
Start: 2021-09-21

## 2021-10-04 ENCOUNTER — ANESTHESIA (OUTPATIENT)
Dept: SURGERY | Facility: MEDICAL CENTER | Age: 41
End: 2021-10-04
Payer: MEDICAID

## 2021-10-04 ENCOUNTER — HOSPITAL ENCOUNTER (OUTPATIENT)
Facility: MEDICAL CENTER | Age: 41
End: 2021-10-04
Attending: SURGERY | Admitting: SURGERY
Payer: MEDICAID

## 2021-10-04 ENCOUNTER — ANESTHESIA EVENT (OUTPATIENT)
Dept: SURGERY | Facility: MEDICAL CENTER | Age: 41
End: 2021-10-04
Payer: MEDICAID

## 2021-10-04 VITALS
HEIGHT: 63 IN | SYSTOLIC BLOOD PRESSURE: 108 MMHG | BODY MASS INDEX: 32.38 KG/M2 | OXYGEN SATURATION: 96 % | DIASTOLIC BLOOD PRESSURE: 61 MMHG | WEIGHT: 182.76 LBS | HEART RATE: 79 BPM | RESPIRATION RATE: 16 BRPM | TEMPERATURE: 96.5 F

## 2021-10-04 DIAGNOSIS — K80.20 SYMPTOMATIC CHOLELITHIASIS: ICD-10-CM

## 2021-10-04 DIAGNOSIS — G89.18 POSTOPERATIVE PAIN: ICD-10-CM

## 2021-10-04 LAB
EXTERNAL QUALITY CONTROL: NORMAL
HCG UR QL: NEGATIVE
PATHOLOGY CONSULT NOTE: NORMAL
SARS-COV+SARS-COV-2 AG RESP QL IA.RAPID: NEGATIVE

## 2021-10-04 PROCEDURE — 160041 HCHG SURGERY MINUTES - EA ADDL 1 MIN LEVEL 4: Performed by: SURGERY

## 2021-10-04 PROCEDURE — 700111 HCHG RX REV CODE 636 W/ 250 OVERRIDE (IP): Performed by: ANESTHESIOLOGY

## 2021-10-04 PROCEDURE — 160046 HCHG PACU - 1ST 60 MINS PHASE II: Performed by: SURGERY

## 2021-10-04 PROCEDURE — 501570 HCHG TROCAR, SEPARATOR: Performed by: SURGERY

## 2021-10-04 PROCEDURE — 700111 HCHG RX REV CODE 636 W/ 250 OVERRIDE (IP): Performed by: SURGERY

## 2021-10-04 PROCEDURE — 501338 HCHG SHEARS, ENDO: Performed by: SURGERY

## 2021-10-04 PROCEDURE — 700102 HCHG RX REV CODE 250 W/ 637 OVERRIDE(OP): Performed by: ANESTHESIOLOGY

## 2021-10-04 PROCEDURE — 160025 RECOVERY II MINUTES (STATS): Performed by: SURGERY

## 2021-10-04 PROCEDURE — 501571 HCHG TROCAR, SEPARATOR 12X100: Performed by: SURGERY

## 2021-10-04 PROCEDURE — 160029 HCHG SURGERY MINUTES - 1ST 30 MINS LEVEL 4: Performed by: SURGERY

## 2021-10-04 PROCEDURE — 501463 HCHG STAPLES, UNIV. ROTIC: Performed by: SURGERY

## 2021-10-04 PROCEDURE — 502571 HCHG PACK, LAP CHOLE: Performed by: SURGERY

## 2021-10-04 PROCEDURE — 700105 HCHG RX REV CODE 258: Performed by: SURGERY

## 2021-10-04 PROCEDURE — 88305 TISSUE EXAM BY PATHOLOGIST: CPT

## 2021-10-04 PROCEDURE — 88304 TISSUE EXAM BY PATHOLOGIST: CPT

## 2021-10-04 PROCEDURE — 700101 HCHG RX REV CODE 250: Performed by: ANESTHESIOLOGY

## 2021-10-04 PROCEDURE — 87426 SARSCOV CORONAVIRUS AG IA: CPT | Performed by: SURGERY

## 2021-10-04 PROCEDURE — 501583 HCHG TROCAR, THRD CAN&SEAL 5X100: Performed by: SURGERY

## 2021-10-04 PROCEDURE — A9270 NON-COVERED ITEM OR SERVICE: HCPCS | Performed by: ANESTHESIOLOGY

## 2021-10-04 PROCEDURE — 160002 HCHG RECOVERY MINUTES (STAT): Performed by: SURGERY

## 2021-10-04 PROCEDURE — 501399 HCHG SPECIMAN BAG, ENDO CATC: Performed by: SURGERY

## 2021-10-04 PROCEDURE — 160036 HCHG PACU - EA ADDL 30 MINS PHASE I: Performed by: SURGERY

## 2021-10-04 PROCEDURE — 160048 HCHG OR STATISTICAL LEVEL 1-5: Performed by: SURGERY

## 2021-10-04 PROCEDURE — 500868 HCHG NEEDLE, SURGI(VARES): Performed by: SURGERY

## 2021-10-04 PROCEDURE — 160035 HCHG PACU - 1ST 60 MINS PHASE I: Performed by: SURGERY

## 2021-10-04 PROCEDURE — 81025 URINE PREGNANCY TEST: CPT

## 2021-10-04 PROCEDURE — 501838 HCHG SUTURE GENERAL: Performed by: SURGERY

## 2021-10-04 PROCEDURE — 700101 HCHG RX REV CODE 250: Performed by: SURGERY

## 2021-10-04 PROCEDURE — 160009 HCHG ANES TIME/MIN: Performed by: SURGERY

## 2021-10-04 RX ORDER — ALPRAZOLAM 0.25 MG/1
0.25 TABLET ORAL NIGHTLY PRN
COMMUNITY

## 2021-10-04 RX ORDER — ACETAMINOPHEN 500 MG
1000 TABLET ORAL ONCE
Status: COMPLETED | OUTPATIENT
Start: 2021-10-04 | End: 2021-10-04

## 2021-10-04 RX ORDER — LORAZEPAM 2 MG/ML
0.5 INJECTION INTRAMUSCULAR
Status: DISCONTINUED | OUTPATIENT
Start: 2021-10-04 | End: 2021-10-04 | Stop reason: HOSPADM

## 2021-10-04 RX ORDER — ROCURONIUM BROMIDE 10 MG/ML
INJECTION, SOLUTION INTRAVENOUS PRN
Status: DISCONTINUED | OUTPATIENT
Start: 2021-10-04 | End: 2021-10-04 | Stop reason: SURG

## 2021-10-04 RX ORDER — ONDANSETRON 2 MG/ML
INJECTION INTRAMUSCULAR; INTRAVENOUS PRN
Status: DISCONTINUED | OUTPATIENT
Start: 2021-10-04 | End: 2021-10-04 | Stop reason: HOSPADM

## 2021-10-04 RX ORDER — SCOLOPAMINE TRANSDERMAL SYSTEM 1 MG/1
1 PATCH, EXTENDED RELEASE TRANSDERMAL
Status: DISCONTINUED | OUTPATIENT
Start: 2021-10-04 | End: 2021-10-04 | Stop reason: HOSPADM

## 2021-10-04 RX ORDER — LABETALOL HYDROCHLORIDE 5 MG/ML
5 INJECTION, SOLUTION INTRAVENOUS
Status: DISCONTINUED | OUTPATIENT
Start: 2021-10-04 | End: 2021-10-04 | Stop reason: HOSPADM

## 2021-10-04 RX ORDER — HYDRALAZINE HYDROCHLORIDE 20 MG/ML
5 INJECTION INTRAMUSCULAR; INTRAVENOUS
Status: DISCONTINUED | OUTPATIENT
Start: 2021-10-04 | End: 2021-10-04 | Stop reason: HOSPADM

## 2021-10-04 RX ORDER — SODIUM CHLORIDE, SODIUM LACTATE, POTASSIUM CHLORIDE, CALCIUM CHLORIDE 600; 310; 30; 20 MG/100ML; MG/100ML; MG/100ML; MG/100ML
INJECTION, SOLUTION INTRAVENOUS
Status: COMPLETED | OUTPATIENT
Start: 2021-10-04 | End: 2021-10-04

## 2021-10-04 RX ORDER — BUPIVACAINE HYDROCHLORIDE AND EPINEPHRINE 5; 5 MG/ML; UG/ML
INJECTION, SOLUTION EPIDURAL; INTRACAUDAL; PERINEURAL
Status: DISCONTINUED | OUTPATIENT
Start: 2021-10-04 | End: 2021-10-04 | Stop reason: HOSPADM

## 2021-10-04 RX ORDER — MIDAZOLAM HYDROCHLORIDE 1 MG/ML
INJECTION INTRAMUSCULAR; INTRAVENOUS PRN
Status: DISCONTINUED | OUTPATIENT
Start: 2021-10-04 | End: 2021-10-04 | Stop reason: SURG

## 2021-10-04 RX ORDER — LIDOCAINE HYDROCHLORIDE 20 MG/ML
INJECTION, SOLUTION EPIDURAL; INFILTRATION; INTRACAUDAL; PERINEURAL PRN
Status: DISCONTINUED | OUTPATIENT
Start: 2021-10-04 | End: 2021-10-04 | Stop reason: SURG

## 2021-10-04 RX ORDER — HALOPERIDOL 5 MG/ML
1 INJECTION INTRAMUSCULAR
Status: DISCONTINUED | OUTPATIENT
Start: 2021-10-04 | End: 2021-10-04 | Stop reason: HOSPADM

## 2021-10-04 RX ORDER — HYDROCODONE BITARTRATE AND ACETAMINOPHEN 7.5; 325 MG/1; MG/1
1 TABLET ORAL EVERY 6 HOURS PRN
Qty: 20 TABLET | Refills: 0 | Status: SHIPPED | OUTPATIENT
Start: 2021-10-04 | End: 2021-10-09

## 2021-10-04 RX ORDER — PHENYLEPHRINE HCL IN 0.9% NACL 0.5 MG/5ML
SYRINGE (ML) INTRAVENOUS PRN
Status: DISCONTINUED | OUTPATIENT
Start: 2021-10-04 | End: 2021-10-04 | Stop reason: SURG

## 2021-10-04 RX ORDER — KETOROLAC TROMETHAMINE 30 MG/ML
INJECTION, SOLUTION INTRAMUSCULAR; INTRAVENOUS PRN
Status: DISCONTINUED | OUTPATIENT
Start: 2021-10-04 | End: 2021-10-04 | Stop reason: SURG

## 2021-10-04 RX ORDER — SODIUM CHLORIDE, SODIUM LACTATE, POTASSIUM CHLORIDE, CALCIUM CHLORIDE 600; 310; 30; 20 MG/100ML; MG/100ML; MG/100ML; MG/100ML
INJECTION, SOLUTION INTRAVENOUS CONTINUOUS
Status: ACTIVE | OUTPATIENT
Start: 2021-10-04 | End: 2021-10-04

## 2021-10-04 RX ORDER — DEXAMETHASONE SODIUM PHOSPHATE 4 MG/ML
INJECTION, SOLUTION INTRA-ARTICULAR; INTRALESIONAL; INTRAMUSCULAR; INTRAVENOUS; SOFT TISSUE PRN
Status: DISCONTINUED | OUTPATIENT
Start: 2021-10-04 | End: 2021-10-04 | Stop reason: SURG

## 2021-10-04 RX ORDER — CEFAZOLIN SODIUM 1 G/3ML
INJECTION, POWDER, FOR SOLUTION INTRAMUSCULAR; INTRAVENOUS PRN
Status: DISCONTINUED | OUTPATIENT
Start: 2021-10-04 | End: 2021-10-04 | Stop reason: SURG

## 2021-10-04 RX ORDER — DIPHENHYDRAMINE HYDROCHLORIDE 50 MG/ML
12.5 INJECTION INTRAMUSCULAR; INTRAVENOUS
Status: DISCONTINUED | OUTPATIENT
Start: 2021-10-04 | End: 2021-10-04 | Stop reason: HOSPADM

## 2021-10-04 RX ORDER — HYDROMORPHONE HYDROCHLORIDE 1 MG/ML
0.1 INJECTION, SOLUTION INTRAMUSCULAR; INTRAVENOUS; SUBCUTANEOUS
Status: DISCONTINUED | OUTPATIENT
Start: 2021-10-04 | End: 2021-10-04 | Stop reason: HOSPADM

## 2021-10-04 RX ORDER — HYDROMORPHONE HYDROCHLORIDE 1 MG/ML
0.2 INJECTION, SOLUTION INTRAMUSCULAR; INTRAVENOUS; SUBCUTANEOUS
Status: DISCONTINUED | OUTPATIENT
Start: 2021-10-04 | End: 2021-10-04 | Stop reason: HOSPADM

## 2021-10-04 RX ORDER — ONDANSETRON 2 MG/ML
4 INJECTION INTRAMUSCULAR; INTRAVENOUS
Status: COMPLETED | OUTPATIENT
Start: 2021-10-04 | End: 2021-10-04

## 2021-10-04 RX ORDER — MEPERIDINE HYDROCHLORIDE 25 MG/ML
6.25 INJECTION INTRAMUSCULAR; INTRAVENOUS; SUBCUTANEOUS
Status: DISCONTINUED | OUTPATIENT
Start: 2021-10-04 | End: 2021-10-04 | Stop reason: HOSPADM

## 2021-10-04 RX ORDER — IBUPROFEN 800 MG/1
800 TABLET ORAL
Qty: 21 TABLET | Refills: 0 | Status: SHIPPED | OUTPATIENT
Start: 2021-10-04 | End: 2021-10-11

## 2021-10-04 RX ORDER — HYDROMORPHONE HYDROCHLORIDE 1 MG/ML
0.4 INJECTION, SOLUTION INTRAMUSCULAR; INTRAVENOUS; SUBCUTANEOUS
Status: DISCONTINUED | OUTPATIENT
Start: 2021-10-04 | End: 2021-10-04 | Stop reason: HOSPADM

## 2021-10-04 RX ADMIN — FENTANYL CITRATE 25 MCG: 50 INJECTION INTRAMUSCULAR; INTRAVENOUS at 14:43

## 2021-10-04 RX ADMIN — FENTANYL CITRATE 25 MCG: 50 INJECTION INTRAMUSCULAR; INTRAVENOUS at 14:25

## 2021-10-04 RX ADMIN — SODIUM CHLORIDE, POTASSIUM CHLORIDE, SODIUM LACTATE AND CALCIUM CHLORIDE: 600; 310; 30; 20 INJECTION, SOLUTION INTRAVENOUS at 09:18

## 2021-10-04 RX ADMIN — DEXAMETHASONE SODIUM PHOSPHATE 8 MG: 4 INJECTION, SOLUTION INTRA-ARTICULAR; INTRALESIONAL; INTRAMUSCULAR; INTRAVENOUS; SOFT TISSUE at 13:01

## 2021-10-04 RX ADMIN — LIDOCAINE HYDROCHLORIDE 100 MG: 20 INJECTION, SOLUTION EPIDURAL; INFILTRATION; INTRACAUDAL at 12:54

## 2021-10-04 RX ADMIN — ONDANSETRON 4 MG: 2 INJECTION INTRAMUSCULAR; INTRAVENOUS at 14:21

## 2021-10-04 RX ADMIN — FENTANYL CITRATE 150 MCG: 50 INJECTION, SOLUTION INTRAMUSCULAR; INTRAVENOUS at 12:54

## 2021-10-04 RX ADMIN — KETOROLAC TROMETHAMINE 30 MG: 30 INJECTION, SOLUTION INTRAMUSCULAR at 13:45

## 2021-10-04 RX ADMIN — Medication 100 MCG: at 13:08

## 2021-10-04 RX ADMIN — ONDANSETRON 4 MG: 2 INJECTION INTRAMUSCULAR; INTRAVENOUS at 13:45

## 2021-10-04 RX ADMIN — FENTANYL CITRATE 50 MCG: 50 INJECTION, SOLUTION INTRAMUSCULAR; INTRAVENOUS at 14:29

## 2021-10-04 RX ADMIN — MIDAZOLAM HYDROCHLORIDE 2 MG: 1 INJECTION, SOLUTION INTRAMUSCULAR; INTRAVENOUS at 12:50

## 2021-10-04 RX ADMIN — SUGAMMADEX 200 MG: 100 INJECTION, SOLUTION INTRAVENOUS at 13:49

## 2021-10-04 RX ADMIN — ROCURONIUM BROMIDE 50 MG: 10 INJECTION, SOLUTION INTRAVENOUS at 12:54

## 2021-10-04 RX ADMIN — PROPOFOL 200 MG: 10 INJECTION, EMULSION INTRAVENOUS at 12:54

## 2021-10-04 RX ADMIN — SCOPALAMINE 1 PATCH: 1 PATCH, EXTENDED RELEASE TRANSDERMAL at 09:17

## 2021-10-04 RX ADMIN — HYDROCODONE BITARTRATE AND ACETAMINOPHEN 15 MG: 7.5; 325 SOLUTION ORAL at 14:28

## 2021-10-04 RX ADMIN — CEFAZOLIN 2 G: 330 INJECTION, POWDER, FOR SOLUTION INTRAMUSCULAR; INTRAVENOUS at 12:50

## 2021-10-04 RX ADMIN — Medication 100 MCG: at 13:07

## 2021-10-04 RX ADMIN — METHOCARBAMOL 1000 MG: 100 INJECTION INTRAMUSCULAR; INTRAVENOUS at 14:39

## 2021-10-04 RX ADMIN — ACETAMINOPHEN 1000 MG: 500 TABLET ORAL at 09:17

## 2021-10-04 ASSESSMENT — PAIN SCALES - GENERAL: PAIN_LEVEL: 4

## 2021-10-04 ASSESSMENT — PAIN DESCRIPTION - PAIN TYPE
TYPE: ACUTE PAIN;SURGICAL PAIN
TYPE: SURGICAL PAIN
TYPE: ACUTE PAIN;SURGICAL PAIN

## 2021-10-04 NOTE — ANESTHESIA PROCEDURE NOTES
Airway    Date/Time: 10/4/2021 12:55 PM  Performed by: Estella Dove M.D.  Authorized by: Estella Dove M.D.     Location:  OR  Urgency:  Elective  Indications for Airway Management:  Anesthesia      Spontaneous Ventilation: absent    Sedation Level:  Deep  Preoxygenated: Yes    Patient Position:  Sniffing  Mask Difficulty Assessment:  1 - vent by mask  Final Airway Type:  Endotracheal airway  Final Endotracheal Airway:  ETT  Cuffed: Yes    Technique Used for Successful ETT Placement:  Direct laryngoscopy  Devices/Methods Used in Placement:  Intubating stylet    Insertion Site:  Oral  Blade Type:  Antwan  Laryngoscope Blade/Videolaryngoscope Blade Size:  3  ETT Size (mm):  7.0  Measured from:  Teeth  ETT to Teeth (cm):  21  Placement Verified by: auscultation and capnometry    Cormack-Lehane Classification:  Grade IIb - view of arytenoids or posterior of glottis only  Number of Attempts at Approach:  1

## 2021-10-04 NOTE — OR NURSING
Pt's  Sarah Beth phoned and updated on pt status in Recovery.  Sarah Beth is in the surgical waiting area.

## 2021-10-04 NOTE — OR NURSING
1522 Report received from CELINA Chawla.     1531 Pt arrived to Phase II into room 23. AAOx4. VSS. Denies pain. C/o moderate nausea, ice pack given to forehead. Abdominal sites with dermabond x4 dressing CDI and soft. Belongings returned to pt bedside. POC update given to pt and to  at bedside, all questions answered.     1600 Nausea under control per pt. Discharge instructions reviewed with pt and . All verbalize understanding and demonstrate teach back. Discharge criteria met. PIV removed. Pt dressed and ambulated to bathroom.     1615 Discharged via wheelchair with CNA escort to responsible adult. All belongings with pt.

## 2021-10-04 NOTE — ANESTHESIA PREPROCEDURE EVALUATION
Relevant Problems   GI   (positive) Gastroesophageal reflux disease without esophagitis         (positive) Kidney disease       Physical Exam    Airway   Mallampati: I  TM distance: >3 FB  Neck ROM: full       Cardiovascular - normal exam     Dental - normal exam           Pulmonary   Breath sounds clear to auscultation     Abdominal   (+) obese     Neurological - normal exam                 Anesthesia Plan    ASA 2       Plan - general       Airway plan will be ETT          Induction: intravenous      Pertinent diagnostic labs and testing reviewed    Informed Consent:    Anesthetic plan and risks discussed with patient.

## 2021-10-04 NOTE — DISCHARGE INSTRUCTIONS
Laparoscopic Cholecystectomy D/C instructions:    1. DIET: Upon discharge from the hospital you may resume your normal preoperative diet. Depending on how you are feeling and whether you have nausea or not, you may wish to stay with a bland diet for the first few days. However, you can advance this as quickly as you feel ready.    2. ACTIVITIES: After discharge from the hospital, you may resume full routine activities. However, there should be no heavy lifting (greater than 15 pounds) and no strenuous activities until after your follow-up visit. Otherwise, routine activities of daily living are acceptable.    3. DRIVING: You may drive whenever you are off pain medications and are able to perform the activities needed to drive, i.e. turning, bending, twisting, etc.    4. BATHING: You may get the wound wet at any time after leaving the hospital. You may shower, but do not submerge in a bath for at least a week.    5. BOWEL FUNCTION: A few patients, after this operation, will develop either frequent or loose stools after meals. This usually corrects itself after a few days, to a few weeks. If this occurs, do not worry; it is not unusual and will resolve. Much more common than loose stools, is constipation. The combination of pain medication and decreased activity level can cause constipation in otherwise normal patients. If you feel this is occurring, take a laxative (Milk of Magnesia, Ex-Lax, Senokot, etc.) until the problem has resolved.    6. PAIN MEDICATION: You will be given a prescription for pain medication at discharge. Please take these as directed. It is important to remember not to take medications on an empty stomach as this may cause nausea.    7.CALL IF YOU HAVE: (1) Fevers to more than 1010 F, (2) Unusual chest or leg pain, (3) Drainage or fluid from incision that may be foul smelling, increased tenderness or soreness at the wound or the wound edges are no longer together, redness or swelling at the  incision site. Please do not hesitate to call with any other questions.     8. APPOINTMENT: Contact our office at [phone] for a follow-up appointment in 1 to 2 weeks following your procedure.    If you have any additional questions, please do not hesitate to call the office and speak to either myself or the physician on call.    Office address:  [address] [place], [place] [address]Renee Morales, [place] [address]    Jethro Dawson M.D.  [facility]  [phone]      ACTIVITY: Rest and take it easy for the first 24 hours.  A responsible adult is recommended to remain with you during that time.  It is normal to feel sleepy.  We encourage you to not do anything that requires balance, judgment or coordination.    MILD FLU-LIKE SYMPTOMS ARE NORMAL. YOU MAY EXPERIENCE GENERALIZED MUSCLE ACHES, THROAT IRRITATION, HEADACHE AND/OR SOME NAUSEA.    FOR 24 HOURS DO NOT:  Drive, operate machinery or run household appliances.  Drink beer or alcoholic beverages.   Make important decisions or sign legal documents.    You should CALL YOUR PHYSICIAN if you develop:  Fever greater than 101 degrees F.  Pain not relieved by medication, or persistent nausea or vomiting.  Excessive bleeding (blood soaking through dressing) or unexpected drainage from the wound.  Extreme redness or swelling around the incision site, drainage of pus or foul smelling drainage.  Inability to urinate or empty your bladder within 8 hours.  Problems with breathing or chest pain.    You should call [phone] if you develop problems with breathing or chest pain.  If you are unable to contact your doctor or surgical center, you should go to the nearest emergency room or urgent care center.  Physician's telephone #: Dr. Dawson [phone]    If any questions arise, call your doctor.  If your doctor is not available, please feel free to call the Surgical Center at [phone]. The Contact Center is open Monday through Friday 7AM to 5PM and may speak to a nurse at [phone],  or toll free at [phone].     A registered nurse may call you a few days after your surgery to see how you are doing after your procedure.    MEDICATIONS: Resume taking daily medication.  Take prescribed pain medication with food.  If no medication is prescribed, you may take non-aspirin pain medication if needed.  PAIN MEDICATION CAN BE VERY CONSTIPATING.  Take a stool softener or laxative such as senokot, pericolace, or milk of magnesia if needed.    Prescription given for Norco.  Last pain medication given at 2:45 pm.    If your physician has prescribed pain medication that includes Acetaminophen (Tylenol), do not take additional Acetaminophen (Tylenol) while taking the prescribed medication.    Depression / Suicide Risk    As you are discharged from this [facility] facility, it is important to learn how to keep safe from harming yourself.    Recognize the warning signs:  · Abrupt changes in personality, positive or negative- including increase in energy   · Giving away possessions  · Change in eating patterns- significant weight changes-  positive or negative  · Change in sleeping patterns- unable to sleep or sleeping all the time   · Unwillingness or inability to communicate  · Depression  · Unusual sadness, discouragement and loneliness  · Talk of wanting to die  · Neglect of personal appearance   · Rebelliousness- reckless behavior  · Withdrawal from people/activities they love  · Confusion- inability to concentrate     If you or a loved one observes any of these behaviors or has concerns about self-harm, here's what you can do:  · Talk about it- your feelings and reasons for harming yourself  · Remove any means that you might use to hurt yourself (examples: pills, rope, extension cords, firearm)  · Get professional help from the community (Mental Health, Substance Abuse, psychological counseling)  · Do not be alone:Call your Safe Contact- someone whom you trust who will be there for you.  · Call  your local CRISIS HOTLINE 631-6686 or 297-778-7729  · Call your local Children's Mobile Crisis Response Team [place] (195) 734-4857 or www.Medigram  · Call the toll free National Suicide Prevention Hotlines   · National Suicide Prevention Lifeline 040-751-SSTQ (5905)  · National Hope Line Network 800-SUICIDE (083-6937)

## 2021-10-04 NOTE — OP REPORT
Operative Report    Date: 10/4/2021    PreOp Diagnosis:   1. Symptomatic cholelithiasis, without evidence of obstruction    PostOp Diagnosis: Same    Procedure(s):  1. CHOLECYSTECTOMY, LAPAROSCOPIC - Wound Class: Clean Contaminated    Surgeon(s):  Jethro Dawson M.D.    Assistant:   SALONI Vanessa  (a surgical first assistant was required for the entire procedure for management of the laparoscopic camera as well as retraction of critical structures)    Anesthesiologist/Type of Anesthesia:  Anesthesiologist: Estella Dove M.D./General    Surgical Staff:  Circulator: Julio Padron R.N.  Relief Circulator: Sarah Burch R.N.  Scrub Person: Aniyah Thompson    Specimens removed if any:  ID Type Source Tests Collected by Time Destination   A : PERIPORTAL LYMPH NODE Tissue Lymph Node PATHOLOGY SPECIMEN Jethro Dawson M.D. 10/4/2021  1:23 PM    B : GALLBLADDER AND CONTENT Tissue Gallbladder PATHOLOGY SPECIMEN Jethro Dawson M.D. 10/4/2021  1:24 PM        Estimated Blood Loss: 5 mL    Findings: Long gallbladder with dilated, tortuous cystic duct which was impacted with gallstones. These were able to be milked into the gallbladder    Complications: None noted    Outcome: Transferred to PACU in stable condition    Indications:  41-year-old female with symptomatic cholelithiasis for which the above procedure was recommended. This was discussed with him in detail including the risk, benefits, alternatives, and he wished to proceed.    Procedure in detail:   Informed consent was obtained. Risks, including but not limited to bleeding, infection, and injury to bile ducts or other structures were discussed with the patient. They were willing to proceed.     The patient was brought to the operating room and was placed in the supine position where general endotracheal anesthesia was induced. SCDs were in place and functioning. Preoperative antibiotics of ancef were given before incision time. The patient's  abdomen was prepped and draped in the usual sterile fashion.    After infiltration of local anesthetic, a skin incision was made to accommodate a 5 mm port supra-umbilically.  A Veress needle was used to access the peritoneum, and after saline drop test, the abdomen was insufflated to 15 mmHg, which the patient tolerated well.  A 5mm 30 degree camera was then introduced and the abdomen inspected for evidence of trauma secondary to Veress needle or port placement, and found none.    Utilizing the 30-degree laparoscope with the patient in the reverse Trendelenburg position, and after infiltration of local anesthetic, an additional 11-mm port was inserted into the epigastrium just to the right of the midline. Then two 5-mm ports were inserted in the midclavicular and anterior axillary lines, in the right upper quadrant, all under direct visualization.    The gallbladder was identified, it appeared mildly inflamed, and there were significant moderately dense adhesions between the omentum and the gallbladder. The gallbladder was retracted cephalad and caudally using gallbladder graspers. Using a combination of blunt and electrocautery dissection, the overlying peritoneum was peeled off the gallbladder infundibulum. Tedious combination of sharp and electrocautery dissection was required to expose the cystic duct, which was significantly tortuous and dilated due to stones impacted within it. Electrocautery was used to further remove the peritoneum off the gallbladder, to allow visualization of the bottom portion of the cystic plate. The cystic artery was then circumferentially dissected.  This allowed clear identification of Calot's triangle and the critical view of safety.      The gallstones were then milked back up from the cystic duct into the gallbladder. The cystic duct/common bile duct junction was visualized. There did not appear to be any visual evidence of stones within the common bile duct. Because of the cystic  duct was dilated and tortuous, it was divided using a 45 mm blue load on the Endo ISAURO stapler. The cystic artery was then doubly clipped and divided.  Using electrocautery, the gallbladder was dissected from the liver bed.  After ensuring gallbladder bed hemostasis with cautery, the specimen was placed it in an endocatch bag, and removed via the epigastric port.    The right upper quadrant was irrigated copiously with normal saline solution. The cystic duct and artery stumps were inspected, and found them to be intact. The liver bed was hemostatic.    The trocars were removed under direct visualization.    The fascia on the all port sites >10 mm were closed with Vicryl sutures. The skin of all incisions was closed with running subcuticular suture and Dermabond.    All sponge, needle, and instrument counts were reported as correct at the end of the procedure. The patient tolerated the procedure well and left the operating room for the recovery room in stable and satisfactory condition.      Jethro Dawson M.D.  [facility]  [phone]        10/4/2021 1:57 PM Jethro Dawson M.D.

## 2021-10-04 NOTE — ANESTHESIA POSTPROCEDURE EVALUATION
Patient: Abelardo Crowell    Procedure Summary     Date: 10/04/21 Room / Location: [place] [address] / SURGERY [facility]    Anesthesia Start: 1250 Anesthesia Stop: 1358    Procedure: CHOLECYSTECTOMY, LAPAROSCOPIC (N/A Abdomen) Diagnosis: (CHOLELITHIASIS WITHOUT OBSTRUCTION)    Surgeons: Jethro Dawson M.D. Responsible Provider: Estella Dove M.D.    Anesthesia Type: general ASA Status: 2          Final Anesthesia Type: general  Last vitals  BP   Blood Pressure: 133/83    Temp   36.6 °C (97.8 °F)    Pulse   (!) 101   Resp   15    SpO2   96 %      Anesthesia Post Evaluation    Patient location during evaluation: PACU  Patient participation: complete - patient participated  Level of consciousness: awake and alert  Pain score: 4    Airway patency: patent  Anesthetic complications: no  Cardiovascular status: adequate  Respiratory status: acceptable  Hydration status: acceptable    PONV: none          No complications documented.

## 2021-10-04 NOTE — OR NURSING
Pt on RA.  Nausea treated with Zofran.  Scopolamine patch in place. Now tolerating PO fluids and medication. X4 laparoscopic abdominal surgical sites CDI, Dermabond.  Ice pack to abdomen.  Surgical pain now tolerable 4/10.  VSS, afebrile, PRATT, A/O x4.  Glasses in place.   Transferred with surgical mask in place.   Prescriptions for Norco and Motrin e-scribed.

## 2021-10-04 NOTE — PROGRESS NOTES
Med rec updated and complete  Allergies reviewed  Interviewed pt with  at bedside with permission from pt.  Pt reports that she took one of her sister TRAZODONE on 9/30/2021, pt is not sure the strength  Pt reports no vitamins

## 2021-10-04 NOTE — ANESTHESIA TIME REPORT
Anesthesia Start and Stop Event Times     Date Time Event    10/4/2021 1207 Ready for Procedure     1250 Anesthesia Start     1358 Anesthesia Stop        Responsible Staff  10/04/21    Name Role Begin End    Estella Dove M.D. Anesth 1250 1358        Preop Diagnosis (Free Text):  Pre-op Diagnosis     CHOLELITHIASIS WITHOUT OBSTRUCTION        Preop Diagnosis (Codes):    Premium Reason  Non-Premium    Comments:

## 2021-11-17 ENCOUNTER — OFFICE VISIT (OUTPATIENT)
Dept: MEDICAL GROUP | Facility: MEDICAL CENTER | Age: 41
End: 2021-11-17
Attending: NURSE PRACTITIONER
Payer: MEDICAID

## 2021-11-17 VITALS — SYSTOLIC BLOOD PRESSURE: 128 MMHG | DIASTOLIC BLOOD PRESSURE: 90 MMHG

## 2021-11-17 DIAGNOSIS — B37.9 ANTIBIOTIC-INDUCED YEAST INFECTION: ICD-10-CM

## 2021-11-17 DIAGNOSIS — T36.95XA ANTIBIOTIC-INDUCED YEAST INFECTION: ICD-10-CM

## 2021-11-17 DIAGNOSIS — R39.89 BLADDER PAIN: ICD-10-CM

## 2021-11-17 LAB
APPEARANCE UR: CLEAR
BILIRUB UR STRIP-MCNC: NEGATIVE MG/DL
COLOR UR AUTO: YELLOW
GLUCOSE UR STRIP.AUTO-MCNC: NEGATIVE MG/DL
KETONES UR STRIP.AUTO-MCNC: NEGATIVE MG/DL
LEUKOCYTE ESTERASE UR QL STRIP.AUTO: NEGATIVE
NITRITE UR QL STRIP.AUTO: NEGATIVE
PH UR STRIP.AUTO: 7 [PH] (ref 5–8)
PROT UR QL STRIP: NEGATIVE MG/DL
RBC UR QL AUTO: NORMAL
SP GR UR STRIP.AUTO: 1.01
UROBILINOGEN UR STRIP-MCNC: 0.2 MG/DL

## 2021-11-17 PROCEDURE — 99214 OFFICE O/P EST MOD 30 MIN: CPT | Performed by: NURSE PRACTITIONER

## 2021-11-17 PROCEDURE — 81002 URINALYSIS NONAUTO W/O SCOPE: CPT | Performed by: NURSE PRACTITIONER

## 2021-11-17 PROCEDURE — 99213 OFFICE O/P EST LOW 20 MIN: CPT | Performed by: NURSE PRACTITIONER

## 2021-11-17 RX ORDER — OXYBUTYNIN CHLORIDE 5 MG/1
5 TABLET ORAL 3 TIMES DAILY
Qty: 90 TABLET | Refills: 0 | Status: SHIPPED | OUTPATIENT
Start: 2021-11-17 | End: 2021-11-17

## 2021-11-17 RX ORDER — OXYBUTYNIN CHLORIDE 5 MG/1
5 TABLET ORAL 3 TIMES DAILY
Qty: 90 TABLET | Refills: 0 | Status: SHIPPED | OUTPATIENT
Start: 2021-11-17 | End: 2021-12-07

## 2021-11-17 RX ORDER — FLUCONAZOLE 150 MG/1
150 TABLET ORAL DAILY
Qty: 2 TABLET | Refills: 0 | Status: SHIPPED | OUTPATIENT
Start: 2021-11-17

## 2021-11-18 NOTE — PROGRESS NOTES
Chief Complaint   Patient presents with   • Dysuria       Subjective:     HPI:   Abelardo Crowell is a 41 y.o. female here to discuss the evaluation and management of:      Problem   Bladder Pain    Patient recently had surgery on her gallbladder with antibiotic usage and green catheter placement. Now having pain with urination and feels like cramping in her bladder. No burning with urination, or increased frequency. Patient does state history of kidney stones and feels she may be starting to get one of those as well.        Antibiotic-Induced Yeast Infection    Patient had recent gall bladder surgery and was given prophylactic abx, now feels she has developed a yeast infection secondary to this. Get's frequent yeast infections yearly. No increased discharge yet but is having pain.            ROS  See HPI     Allergies   Allergen Reactions   • Oxycodone Itching     Severe itching    • Morphine      Projectile vomitting   • Percocet [Apap-Fd&C Red #40 Al Carrillo-Oxycodone] Itching       Current medicines (including changes today)  Current Outpatient Medications   Medication Sig Dispense Refill   • fluconazole (DIFLUCAN) 150 MG tablet Take 1 Tablet by mouth every day. 2 Tablet 0   • oxybutynin (DITROPAN) 5 MG Tab Take 1 Tablet by mouth 3 times a day. 90 Tablet 0   • FEXOFENADINE HCL PO Take 1 Tablet by mouth every day.     • TRAZODONE HCL PO Take 1 Tablet by mouth one time. Pts sister RX     • ALPRAZolam (XANAX) 0.25 MG Tab Take 0.25 mg by mouth at bedtime as needed for Anxiety.     • amoxicillin (AMOXIL) 500 MG Cap Take 1 Capsule by mouth 3 times a day. (Patient taking differently: Take 500 mg by mouth 3 times a day. Pt started on 9/21/2021 for 10 day course) 30 Capsule 0   • omeprazole (PRILOSEC) 40 MG delayed-release capsule Take 1 capsule by mouth every day. 30 capsule 11   • albuterol 108 (90 Base) MCG/ACT Aero Soln inhalation aerosol Inhale 2 Puffs every 6 hours as needed for Shortness of Breath. 8.5 g 6     No  current facility-administered medications for this visit.       Social History     Tobacco Use   • Smoking status: Never Smoker   • Smokeless tobacco: Never Used   Vaping Use   • Vaping Use: Never used   Substance Use Topics   • Alcohol use: Not Currently   • Drug use: Never       Patient Active Problem List    Diagnosis Date Noted   • Bladder pain 11/17/2021   • Antibiotic-induced yeast infection 11/17/2021   • Symptomatic cholelithiasis 10/04/2021   • Obesity (BMI 30-39.9) 11/12/2019   • Gastroesophageal reflux disease without esophagitis 11/12/2019   • Lactose intolerance 11/12/2019   • Kidney disease        Family History   Problem Relation Age of Onset   • Diabetes Mother    • Stroke Father    • Cancer Paternal Aunt         Breast   • Cancer Maternal Grandmother         Breast   • Heart Disease Neg Hx           Objective:     /90  There is no height or weight on file to calculate BMI.    Physical Exam:  Physical Exam  Vitals reviewed.   Constitutional:       Appearance: Normal appearance.   HENT:      Head: Normocephalic.   Eyes:      Conjunctiva/sclera: Conjunctivae normal.   Cardiovascular:      Rate and Rhythm: Normal rate and regular rhythm.      Heart sounds: Normal heart sounds.   Pulmonary:      Effort: Pulmonary effort is normal. No respiratory distress.      Breath sounds: Normal breath sounds.   Abdominal:      Tenderness: There is no right CVA tenderness or left CVA tenderness.   Skin:     General: Skin is warm and dry.   Neurological:      Mental Status: She is alert and oriented to person, place, and time.              Assessment and Plan:     The following treatment plan was discussed:    Problem List Items Addressed This Visit     Bladder pain     Acute - uncontrolled  POC UA in clinic only positive for trace blood   No flank pain or CVA on palpation   Was going to order belladonna supp however patient is allergic, will instead trial on oxybutin for bladder spasms   Patient to let provider  know if medication is helping.   If continues will have patient follow up with Urology as she is already established from previous renal stone needs.            Relevant Medications    oxybutynin (DITROPAN) 5 MG Tab    Antibiotic-induced yeast infection     Acute on Chronic, uncontrolled-  Diflucan ordered  Patient instructed on usage          Relevant Medications    fluconazole (DIFLUCAN) 150 MG tablet          Any change or worsening of signs or symptoms, patient encouraged to follow-up or report to emergency room for further evaluation. Patient verbalizes understanding and agrees.    Follow-Up: Return if symptoms worsen or fail to improve.      PLEASE NOTE: This dictation was created using voice recognition software. I have made every reasonable attempt to correct obvious errors, but I expect that there are errors of grammar and possibly content that I did not discover before finalizing the note.

## 2021-11-18 NOTE — ASSESSMENT & PLAN NOTE
Acute - uncontrolled  POC UA in clinic only positive for trace blood   No flank pain or CVA on palpation   Was going to order belladonna supp however patient is allergic, will instead trial on oxybutin for bladder spasms   Patient to let provider know if medication is helping.   If continues will have patient follow up with Urology as she is already established from previous renal stone needs.

## 2021-12-07 DIAGNOSIS — R39.89 BLADDER PAIN: ICD-10-CM

## 2021-12-07 RX ORDER — OXYBUTYNIN CHLORIDE 5 MG/1
TABLET ORAL
Qty: 90 TABLET | Refills: 0 | Status: SHIPPED | OUTPATIENT
Start: 2021-12-07

## 2021-12-07 NOTE — TELEPHONE ENCOUNTER
Received request via: Pharmacy    Was the patient seen in the last year in this department? Yes    Does the patient have an active prescription (recently filled or refills available) for medication(s) requested? Yes     Med last filled 11/17/21.

## 2024-05-29 ENCOUNTER — HOSPITAL ENCOUNTER (OUTPATIENT)
Dept: RADIOLOGY | Facility: MEDICAL CENTER | Age: 44
End: 2024-05-29
Attending: OBSTETRICS & GYNECOLOGY
Payer: MEDICAID

## 2024-05-29 DIAGNOSIS — N93.9 HEMORRHAGE IN UTERUS: ICD-10-CM

## (undated) DEVICE — GLOVE BIOGEL SZ 7.5 SURGICAL PF LTX - (50PR/BX 4BX/CA)

## (undated) DEVICE — SET SUCTION/IRRIGATION WITH DISPOSABLE TIP (6/CA )PART #0250-070-520 IS A SUB

## (undated) DEVICE — GLOVE BIOGEL INDICATOR SZ 7.5 SURGICAL PF LTX - (50PR/BX 4BX/CA)

## (undated) DEVICE — KIT ANESTHESIA W/CIRCUIT & 3/LT BAG W/FILTER (20EA/CA)

## (undated) DEVICE — GOWN SURGEONS X-LARGE - DISP. (30/CA)

## (undated) DEVICE — SET LEADWIRE 5 LEAD BEDSIDE DISPOSABLE ECG (1SET OF 5/EA)

## (undated) DEVICE — PACK LAP CHOLE OR - (2EA/CA)

## (undated) DEVICE — SET TUBING PNEUMOCLEAR HIGH FLOW SMOKE EVACUATION (10EA/BX)

## (undated) DEVICE — GOWN WARMING STANDARD FLEX - (30/CA)

## (undated) DEVICE — NEEDLE SAFETY 18 GA X 1 1/2 IN (100EA/BX)

## (undated) DEVICE — TUBING CLEARLINK DUO-VENT - C-FLO (48EA/CA)

## (undated) DEVICE — MASK ANESTHESIA ADULT  - (100/CA)

## (undated) DEVICE — LACTATED RINGERS INJ 1000 ML - (14EA/CA 60CA/PF)

## (undated) DEVICE — CONTAINER SPECIMEN BAG OR - STERILE 4 OZ W/LID (100EA/CA)

## (undated) DEVICE — TUBE CONNECT SUCTION CLEAR 120 X 1/4" (50EA/CA)"

## (undated) DEVICE — SENSOR SPO2 NEO LNCS ADHESIVE (20/BX) SEE USER NOTES

## (undated) DEVICE — SET EXTENSION WITH 2 PORTS (48EA/CA) ***PART #2C8610 IS A SUBSTITUTE*****

## (undated) DEVICE — TOWEL STOP TIMEOUT SAFETY FLAG (40EA/CA)

## (undated) DEVICE — PROTECTOR ULNA NERVE - (36PR/CA)

## (undated) DEVICE — SODIUM CHL IRRIGATION 0.9% 1000ML (12EA/CA)

## (undated) DEVICE — ELECTRODE 850 FOAM ADHESIVE - HYDROGEL RADIOTRNSPRNT (50/PK)

## (undated) DEVICE — CLIP MED LG INTNL HRZN TI ESCP - (20/BX)

## (undated) DEVICE — SCISSORS 5MM CVD (6EA/BX)

## (undated) DEVICE — BAG RETRIEVAL 10ML (10EA/BX)

## (undated) DEVICE — SUCTION INSTRUMENT YANKAUER BULBOUS TIP W/O VENT (50EA/CA)

## (undated) DEVICE — TROCAR Z THREAD12MM OPTICAL - NON BLADED (6/BX)

## (undated) DEVICE — TROCAR 5X100 NON BLADED Z-TH - READ KII (6/BX)

## (undated) DEVICE — ELECTRODE DUAL RETURN W/ CORD - (50/PK)

## (undated) DEVICE — GLOVE BIOGEL PI ORTHO SZ 7 PF LF (40PR/BX)

## (undated) DEVICE — SYRINGE 30 ML LL (56/BX)

## (undated) DEVICE — STAPLER POWERED 45MM (3EA/BX)

## (undated) DEVICE — SUTURE 4-0 VICRYL PLUS FS-2 - 27 INCH (36/BX)

## (undated) DEVICE — CANISTER SUCTION 3000ML MECHANICAL FILTER AUTO SHUTOFF MEDI-VAC NONSTERILE LF DISP  (40EA/CA)

## (undated) DEVICE — STAPLE 45MM BLUE 3.5MM ECHELON (12EA/BX)

## (undated) DEVICE — DRAPESURG STERI-DRAPE LONG - (10/BX 4BX/CA)

## (undated) DEVICE — CANNULA W/SEAL 5X100 Z-THRE - ADED KII (12/BX)

## (undated) DEVICE — NEEDLE INSFL 120MM 14GA VRRS - (20/BX)

## (undated) DEVICE — CHLORAPREP 26 ML APPLICATOR - ORANGE TINT(25/CA)

## (undated) DEVICE — HEAD HOLDER JUNIOR/ADULT

## (undated) DEVICE — SUTURE GENERAL

## (undated) DEVICE — NEPTUNE 4 PORT MANIFOLD - (20/PK)